# Patient Record
Sex: FEMALE | Race: WHITE
[De-identification: names, ages, dates, MRNs, and addresses within clinical notes are randomized per-mention and may not be internally consistent; named-entity substitution may affect disease eponyms.]

---

## 2017-01-19 ENCOUNTER — HOSPITAL ENCOUNTER (OUTPATIENT)
Dept: HOSPITAL 53 - M LAB REF | Age: 27
End: 2017-01-19
Attending: ADVANCED PRACTICE MIDWIFE
Payer: COMMERCIAL

## 2017-01-19 DIAGNOSIS — Z12.4: Primary | ICD-10-CM

## 2017-01-20 ENCOUNTER — HOSPITAL ENCOUNTER (OUTPATIENT)
Dept: HOSPITAL 53 - M LAB | Age: 27
End: 2017-01-20
Attending: ADVANCED PRACTICE MIDWIFE
Payer: COMMERCIAL

## 2017-01-20 DIAGNOSIS — C50.919: Primary | ICD-10-CM

## 2017-01-20 DIAGNOSIS — M81.0: ICD-10-CM

## 2017-01-20 DIAGNOSIS — Z13.820: ICD-10-CM

## 2017-01-20 LAB
BASOPHILS # BLD AUTO: 0.1 K/MM3 (ref 0–0.2)
BASOPHILS NFR BLD AUTO: 1.5 % (ref 0–1)
EOSINOPHIL # BLD AUTO: 0.1 K/MM3 (ref 0–0.5)
EOSINOPHIL NFR BLD AUTO: 2.3 % (ref 0–3)
ERYTHROCYTE [DISTWIDTH] IN BLOOD BY AUTOMATED COUNT: 12.6 % (ref 11.5–14.5)
GLUCOSE SERPL-MCNC: 84 MG/DL (ref 70–105)
LARGE UNSTAINED CELL #: 0.2 K/MM3 (ref 0–0.4)
LARGE UNSTAINED CELL %: 3.4 % (ref 0–4)
LYMPHOCYTES # BLD AUTO: 1.6 K/MM3 (ref 1.5–6.5)
LYMPHOCYTES NFR BLD AUTO: 26.9 % (ref 24–44)
MCH RBC QN AUTO: 31.1 PG (ref 27–33)
MCHC RBC AUTO-ENTMCNC: 34.2 G/DL (ref 32–36.5)
MCV RBC AUTO: 91.1 FL (ref 80–96)
MONOCYTES # BLD AUTO: 0.3 K/MM3 (ref 0–0.8)
MONOCYTES NFR BLD AUTO: 6.4 % (ref 0–5)
NEUTROPHILS # BLD AUTO: 3.1 K/MM3 (ref 1.8–7.7)
NEUTROPHILS NFR BLD AUTO: 59.5 % (ref 36–66)
PLATELET # BLD AUTO: 269 K/MM3 (ref 150–450)
T3RU NFR SERPL: 36 % (ref 30–39)
T4 SERPL-MCNC: 10.2 UG/DL (ref 4.5–12)
WBC # BLD AUTO: 5.2 K/MM3 (ref 4–10)

## 2017-03-19 ENCOUNTER — HOSPITAL ENCOUNTER (OUTPATIENT)
Dept: HOSPITAL 53 - M LAB REF | Age: 27
End: 2017-03-19
Attending: PHYSICIAN ASSISTANT
Payer: COMMERCIAL

## 2017-03-19 DIAGNOSIS — J02.9: Primary | ICD-10-CM

## 2017-05-22 ENCOUNTER — HOSPITAL ENCOUNTER (EMERGENCY)
Dept: HOSPITAL 53 - M ED | Age: 27
Discharge: HOME | End: 2017-05-22
Payer: COMMERCIAL

## 2017-05-22 VITALS — HEIGHT: 61 IN | BODY MASS INDEX: 26.43 KG/M2 | WEIGHT: 140 LBS

## 2017-05-22 VITALS — DIASTOLIC BLOOD PRESSURE: 75 MMHG | SYSTOLIC BLOOD PRESSURE: 116 MMHG

## 2017-05-22 DIAGNOSIS — N83.201: Primary | ICD-10-CM

## 2017-05-22 LAB
ALBUMIN SERPL BCG-MCNC: 3.8 GM/DL (ref 3.2–5.2)
ALBUMIN/GLOB SERPL: 1.15 {RATIO} (ref 1–1.93)
ALP SERPL-CCNC: 52 U/L (ref 45–117)
ALT SERPL W P-5'-P-CCNC: 21 U/L (ref 12–78)
ANION GAP SERPL CALC-SCNC: 8 MEQ/L (ref 8–16)
AST SERPL-CCNC: 22 U/L (ref 15–37)
BASOPHILS # BLD AUTO: 0 K/MM3 (ref 0–0.2)
BASOPHILS NFR BLD AUTO: 0.5 % (ref 0–1)
BILIRUB CONJ SERPL-MCNC: < 0.1 MG/DL (ref 0–0.2)
BILIRUB SERPL-MCNC: 0.4 MG/DL (ref 0.2–1)
BUN SERPL-MCNC: 12 MG/DL (ref 7–18)
CALCIUM SERPL-MCNC: 8.4 MG/DL (ref 8.5–10.1)
CHLORIDE SERPL-SCNC: 104 MEQ/L (ref 98–107)
CO2 SERPL-SCNC: 27 MEQ/L (ref 21–32)
CONTROL LINE HCG: (no result)
CREAT SERPL-MCNC: 0.79 MG/DL (ref 0.55–1.02)
EOSINOPHIL # BLD AUTO: 0.2 K/MM3 (ref 0–0.5)
EOSINOPHIL NFR BLD AUTO: 2.4 % (ref 0–3)
ERYTHROCYTE [DISTWIDTH] IN BLOOD BY AUTOMATED COUNT: 12.2 % (ref 11.5–14.5)
GFR SERPL CREATININE-BSD FRML MDRD: > 60 ML/MIN/{1.73_M2} (ref 60–?)
GLUCOSE SERPL-MCNC: 89 MG/DL (ref 70–105)
INR PPP: 0.94
LARGE UNSTAINED CELL #: 0.2 K/MM3 (ref 0–0.4)
LARGE UNSTAINED CELL %: 2 % (ref 0–4)
LYMPHOCYTES # BLD AUTO: 2 K/MM3 (ref 1.5–6.5)
LYMPHOCYTES NFR BLD AUTO: 24.9 % (ref 24–44)
MCH RBC QN AUTO: 32.9 PG (ref 27–33)
MCHC RBC AUTO-ENTMCNC: 35.9 G/DL (ref 32–36.5)
MCV RBC AUTO: 91.6 FL (ref 80–96)
MONOCYTES # BLD AUTO: 0.4 K/MM3 (ref 0–0.8)
MONOCYTES NFR BLD AUTO: 5.4 % (ref 0–5)
NEUTROPHILS # BLD AUTO: 5.2 K/MM3 (ref 1.8–7.7)
NEUTROPHILS NFR BLD AUTO: 64.8 % (ref 36–66)
PLATELET # BLD AUTO: 287 K/MM3 (ref 150–450)
POTASSIUM SERPL-SCNC: 3.8 MEQ/L (ref 3.5–5.1)
PROT SERPL-MCNC: 7.1 GM/DL (ref 6.4–8.2)
SODIUM SERPL-SCNC: 139 MEQ/L (ref 136–145)
WBC # BLD AUTO: 8 K/MM3 (ref 4–10)

## 2017-05-22 PROCEDURE — 99283 EMERGENCY DEPT VISIT LOW MDM: CPT

## 2017-05-22 PROCEDURE — 80048 BASIC METABOLIC PNL TOTAL CA: CPT

## 2017-05-22 PROCEDURE — 84703 CHORIONIC GONADOTROPIN ASSAY: CPT

## 2017-05-22 PROCEDURE — 81001 URINALYSIS AUTO W/SCOPE: CPT

## 2017-05-22 PROCEDURE — 74177 CT ABD & PELVIS W/CONTRAST: CPT

## 2017-05-22 PROCEDURE — 85610 PROTHROMBIN TIME: CPT

## 2017-05-22 PROCEDURE — 80076 HEPATIC FUNCTION PANEL: CPT

## 2017-05-22 PROCEDURE — 96361 HYDRATE IV INFUSION ADD-ON: CPT

## 2017-05-22 PROCEDURE — 86850 RBC ANTIBODY SCREEN: CPT

## 2017-05-22 PROCEDURE — 87086 URINE CULTURE/COLONY COUNT: CPT

## 2017-05-22 PROCEDURE — 85025 COMPLETE CBC W/AUTO DIFF WBC: CPT

## 2017-05-22 PROCEDURE — 83690 ASSAY OF LIPASE: CPT

## 2017-05-22 PROCEDURE — 86901 BLOOD TYPING SEROLOGIC RH(D): CPT

## 2017-05-22 PROCEDURE — 96374 THER/PROPH/DIAG INJ IV PUSH: CPT

## 2017-05-22 PROCEDURE — 86900 BLOOD TYPING SEROLOGIC ABO: CPT

## 2017-05-22 NOTE — REP
Clinical:  Acute lower abdominal pain.

 

Technique:  Axial contrast enhanced images from the lung bases to the pubic

symphysis using 100 ml Isovue 370 intravenous contrast material with coronal and

sagittal re-formations.

 

Findings:

A 2.1 cm rim enhancing left ovarian cyst is appreciated with surrounding fluid

and small amount of fluid extending into the pelvis likely related to patient's

symptoms and compatible with involuting, possibly ruptured ovarian cyst. The

uterus and right adnexa appear normal.

 

Lung bases are clear.  Visualized heart and pericardium are normal.  Liver,

spleen, pancreas, gallbladder, bilateral adrenal glands and kidneys are normal.

The enteric system is without obstruction or acute inflammatory process.  Normal

terminal ileum and appendix identified in the right lower quadrant.  Pelvis

demonstrates normal bladder and findings as described above.  No ascites.  No

free air.  No retroperitoneal or intraperitoneal adenopathy.  Vasculature

including abdominal aorta is normal.  Surrounding musculoskeletal structures

without focal osseous abnormality.

 

Impression:

1.  A 2.1 cm rim enhancing left ovarian cyst with surrounding fluid and trace

fluid extending to the pelvis most compatible with involuting, possibly ruptured

ovarian cyst and likely related to patient's symptoms.

2.  Remainder examination appears normal.

 

 

Signed by

Fuentes Castellanos MD 05/22/2017 07:31 P

## 2017-09-21 ENCOUNTER — HOSPITAL ENCOUNTER (OUTPATIENT)
Dept: HOSPITAL 53 - M RAD | Age: 27
End: 2017-09-21
Attending: ADVANCED PRACTICE MIDWIFE
Payer: COMMERCIAL

## 2017-09-21 DIAGNOSIS — R10.9: Primary | ICD-10-CM

## 2017-09-21 NOTE — REP
Pelvic ultrasound including transabdominal, endovaginal and Doppler ultrasound

assessment:

 

Comparison is 12/01/2016.

 

The bladder is adequately distended.

 

The uterus is anteverted and normal size measuring 7.4 x 3.4 x 3.4 cm.  The

myometrium has a mildly heterogeneous.

 

The endometrium is not thickened measuring 6.5 mm.

 

The ovaries are normal size.

Right ovary measures 3.3 x 2.1 x 2.7 cm.

The left ovary measures 2.9 x 1.3 x 2.2 cm.

 

With Doppler assessment there is vascular flow in both ovaries with the Doppler

resistive index of the intraparenchymal arteries on the left measuring 0.53 and

on the right 0.60.

 

There are a few small follicles in each ovary.  No dominant ovarian mass or cyst.

There is no free fluid in the pelvis.

 

Impression:

 

Essentially negative pelvic ultrasound.

 

 

Signed by

Pako Mccall MD 09/21/2017 08:18 A

## 2017-10-18 ENCOUNTER — HOSPITAL ENCOUNTER (OUTPATIENT)
Dept: HOSPITAL 53 - M LAB | Age: 27
End: 2017-10-18
Attending: ADVANCED PRACTICE MIDWIFE
Payer: COMMERCIAL

## 2017-10-18 DIAGNOSIS — N91.2: Primary | ICD-10-CM

## 2018-03-03 ENCOUNTER — HOSPITAL ENCOUNTER (EMERGENCY)
Dept: HOSPITAL 53 - M ED | Age: 28
Discharge: HOME | End: 2018-03-03
Payer: COMMERCIAL

## 2018-03-03 DIAGNOSIS — M25.532: Primary | ICD-10-CM

## 2018-03-03 PROCEDURE — 73110 X-RAY EXAM OF WRIST: CPT

## 2018-03-03 RX ADMIN — ACETAMINOPHEN 1 MG: 325 TABLET ORAL at 09:25

## 2018-03-16 ENCOUNTER — HOSPITAL ENCOUNTER (OUTPATIENT)
Dept: HOSPITAL 53 - M LAB | Age: 28
End: 2018-03-16
Attending: ADVANCED PRACTICE MIDWIFE
Payer: COMMERCIAL

## 2018-03-16 DIAGNOSIS — Z36.89: Primary | ICD-10-CM

## 2018-03-16 DIAGNOSIS — Z3A.10: ICD-10-CM

## 2018-03-16 LAB
ALT SERPL W P-5'-P-CCNC: 24 U/L (ref 12–78)
AST SERPL-CCNC: 14 U/L (ref 7–37)
BASO #: 0 10^3/UL (ref 0–0.2)
BASO %: 0.2 % (ref 0–1)
BILIRUBIN,TOTAL: 0.4 MG/DL (ref 0.2–1)
CHLAMYDIA DNA AMPLIFICATION: NEGATIVE
CREATININE FOR GFR: 0.45 MG/DL (ref 0.55–1.3)
CREATININE,RANDOM URINE: 179 MG/DL
EOS #: 0.2 10^3/UL (ref 0–0.5)
EOSINOPHIL NFR BLD AUTO: 2.1 % (ref 0–3)
GC DNA AMPLIFICATION: NEGATIVE
GFR SERPL CREATININE-BSD FRML MDRD: > 60 ML/MIN/{1.73_M2} (ref 60–?)
HBSAG PRENATAL: NEGATIVE
HCV AB SER QL: 0 INDEX (ref ?–0.8)
HEMATOCRIT: 38.2 % (ref 36–47)
HEMOGLOBIN: 13.8 G/DL (ref 12–16)
HIV 1&2 SCREEN CENTAUR: NEGATIVE
IMMATURE GRANULOCYTE %: 0.3 % (ref 0–3)
LDH SERPL L TO P-CCNC: 171 U/L (ref 84–246)
LYMPH #: 1.9 10^3/UL (ref 1.5–6.5)
LYMPH %: 18.4 % (ref 24–44)
MEAN CORPUSCULAR HEMOGLOBIN: 31.9 PG (ref 27–33)
MEAN CORPUSCULAR HGB CONC: 36.1 G/DL (ref 32–36.5)
MEAN CORPUSCULAR VOLUME: 88.4 FL (ref 80–96)
MONO #: 0.7 10^3/UL (ref 0–0.8)
MONO %: 7 % (ref 0–5)
NEUTROPHILS #: 7.6 10^3/UL (ref 1.8–7.7)
NEUTROPHILS %: 72 % (ref 36–66)
NRBC BLD AUTO-RTO: 0 % (ref 0–0)
PLATELET COUNT, AUTOMATED: 302 10^3/UL (ref 150–450)
RED BLOOD COUNT: 4.32 10^6/UL (ref 4–5.4)
RED CELL DISTRIBUTION WIDTH: 12.2 % (ref 11.5–14.5)
RUBELLA IGG QUALITATIVE: (no result)
SYPHILIS: NONREACTIVE
TOTAL PROTEIN,RANDOM URINE: 9.1 MG/DL (ref 0–12)
URIC ACID: 1.9 MG/DL (ref 2.6–6)
WHITE BLOOD COUNT: 10.5 10^3/UL (ref 4–10)

## 2018-03-16 PROCEDURE — 84460 ALANINE AMINO (ALT) (SGPT): CPT

## 2018-04-21 ENCOUNTER — HOSPITAL ENCOUNTER (EMERGENCY)
Dept: HOSPITAL 53 - M ED | Age: 28
Discharge: HOME | End: 2018-04-21
Payer: COMMERCIAL

## 2018-04-21 DIAGNOSIS — O21.9: Primary | ICD-10-CM

## 2018-04-21 DIAGNOSIS — Z79.899: ICD-10-CM

## 2018-04-21 DIAGNOSIS — R19.7: ICD-10-CM

## 2018-04-21 DIAGNOSIS — Z3A.17: ICD-10-CM

## 2018-04-21 DIAGNOSIS — O99.89: ICD-10-CM

## 2018-04-21 DIAGNOSIS — Z88.5: ICD-10-CM

## 2018-04-21 DIAGNOSIS — O16.2: ICD-10-CM

## 2018-04-21 LAB
ALBUMIN/GLOBULIN RATIO: 0.91 (ref 1–1.93)
ALBUMIN: 3.2 GM/DL (ref 3.2–5.2)
ALKALINE PHOSPHATASE: 60 U/L (ref 45–117)
ALT SERPL W P-5'-P-CCNC: 22 U/L (ref 12–78)
AMYLASE SERPL-CCNC: 28 U/L (ref 25–115)
ANION GAP: 9 MEQ/L (ref 8–16)
AST SERPL-CCNC: 15 U/L (ref 7–37)
BASO #: 0 10^3/UL (ref 0–0.2)
BASO %: 0.2 % (ref 0–1)
BILIRUB CONJ SERPL-MCNC: 0.2 MG/DL (ref 0–0.2)
BILIRUBIN,TOTAL: 0.8 MG/DL (ref 0.2–1)
BLOOD UREA NITROGEN: 7 MG/DL (ref 7–18)
CALCIUM LEVEL: 8.3 MG/DL (ref 8.5–10.1)
CARBON DIOXIDE LEVEL: 24 MEQ/L (ref 21–32)
CHLORIDE LEVEL: 105 MEQ/L (ref 98–107)
CREATININE FOR GFR: 0.52 MG/DL (ref 0.55–1.3)
EOS #: 0.1 10^3/UL (ref 0–0.5)
EOSINOPHIL NFR BLD AUTO: 0.5 % (ref 0–3)
GFR SERPL CREATININE-BSD FRML MDRD: > 60 ML/MIN/{1.73_M2} (ref 60–?)
GLUCOSE, FASTING: 87 MG/DL (ref 70–100)
HEMATOCRIT: 35.8 % (ref 36–47)
HEMOGLOBIN: 12.8 G/DL (ref 12–15.5)
IMMATURE GRANULOCYTE %: 0.5 % (ref 0–3)
LEUKOCYTE ESTERASE UR AUTO RFX: (no result)
LIPASE: 62 U/L (ref 73–393)
LYMPH #: 0.9 10^3/UL (ref 1.5–6.5)
LYMPH %: 6.9 % (ref 24–44)
MEAN CORPUSCULAR HEMOGLOBIN: 31.8 PG (ref 27–33)
MEAN CORPUSCULAR HGB CONC: 35.8 G/DL (ref 32–36.5)
MEAN CORPUSCULAR VOLUME: 88.8 FL (ref 80–96)
MONO #: 0.5 10^3/UL (ref 0–0.8)
MONO %: 3.8 % (ref 0–5)
NEUTROPHILS #: 11.3 10^3/UL (ref 1.8–7.7)
NEUTROPHILS %: 88.1 % (ref 36–66)
NRBC BLD AUTO-RTO: 0 % (ref 0–0)
PLATELET COUNT, AUTOMATED: 250 10^3/UL (ref 150–450)
POTASSIUM SERUM: 3.8 MEQ/L (ref 3.5–5.1)
RED BLOOD COUNT: 4.03 10^6/UL (ref 4–5.4)
RED CELL DISTRIBUTION WIDTH: 13.2 % (ref 11.5–14.5)
SODIUM LEVEL: 138 MEQ/L (ref 136–145)
SPECIFIC GRAVITY UR AUTO RFX: 1.03 (ref 1–1.03)
SQUAM EPITHELIAL CELL UR AURFX: 9 /HPF (ref 0–6)
TOTAL PROTEIN: 6.7 GM/DL (ref 6.4–8.2)
WHITE BLOOD COUNT: 12.8 10^3/UL (ref 4–10)

## 2018-04-21 RX ADMIN — SODIUM CHLORIDE 1 MLS/HR: 9 INJECTION, SOLUTION INTRAVENOUS at 08:00

## 2018-04-21 RX ADMIN — ONDANSETRON 1 MG: 2 INJECTION INTRAMUSCULAR; INTRAVENOUS at 08:20

## 2018-05-02 ENCOUNTER — HOSPITAL ENCOUNTER (OUTPATIENT)
Dept: HOSPITAL 53 - M RAD | Age: 28
End: 2018-05-02
Attending: ADVANCED PRACTICE MIDWIFE
Payer: COMMERCIAL

## 2018-05-02 DIAGNOSIS — Z34.82: Primary | ICD-10-CM

## 2018-05-02 PROCEDURE — 76816 OB US FOLLOW-UP PER FETUS: CPT

## 2018-05-15 ENCOUNTER — HOSPITAL ENCOUNTER (OUTPATIENT)
Dept: HOSPITAL 53 - M RAD | Age: 28
End: 2018-05-15
Payer: COMMERCIAL

## 2018-05-15 DIAGNOSIS — Z34.82: Primary | ICD-10-CM

## 2018-05-15 DIAGNOSIS — Z3A.20: ICD-10-CM

## 2018-05-15 PROCEDURE — 76816 OB US FOLLOW-UP PER FETUS: CPT

## 2018-06-19 ENCOUNTER — HOSPITAL ENCOUNTER (OUTPATIENT)
Dept: HOSPITAL 53 - M SMT | Age: 28
End: 2018-06-19
Attending: ADVANCED PRACTICE MIDWIFE
Payer: COMMERCIAL

## 2018-06-19 ENCOUNTER — HOSPITAL ENCOUNTER (OUTPATIENT)
Dept: HOSPITAL 53 - M LAB REF | Age: 28
End: 2018-06-19
Attending: ADVANCED PRACTICE MIDWIFE
Payer: COMMERCIAL

## 2018-06-19 DIAGNOSIS — O10.012: Primary | ICD-10-CM

## 2018-06-19 DIAGNOSIS — Z3A.00: ICD-10-CM

## 2018-06-19 LAB
ALT SERPL W P-5'-P-CCNC: 21 U/L (ref 12–78)
AST SERPL-CCNC: 13 U/L (ref 7–37)
BILIRUBIN,TOTAL: 0.3 MG/DL (ref 0.2–1)
CREATININE FOR GFR: 0.49 MG/DL (ref 0.55–1.3)
CREATININE,RANDOM URINE: 237 MG/DL
GFR SERPL CREATININE-BSD FRML MDRD: > 60 ML/MIN/{1.73_M2} (ref 60–?)
HEMATOCRIT: 33.7 % (ref 36–47)
HEMOGLOBIN: 11.7 G/DL (ref 12–15.5)
LDH SERPL L TO P-CCNC: 206 U/L (ref 84–246)
MEAN CORPUSCULAR HEMOGLOBIN: 31.5 PG (ref 27–33)
MEAN CORPUSCULAR HGB CONC: 34.7 G/DL (ref 32–36.5)
MEAN CORPUSCULAR VOLUME: 90.8 FL (ref 80–96)
NRBC BLD AUTO-RTO: 0 % (ref 0–0)
PLATELET COUNT, AUTOMATED: 289 10^3/UL (ref 150–450)
RED BLOOD COUNT: 3.71 10^6/UL (ref 4–5.4)
RED CELL DISTRIBUTION WIDTH: 13.5 % (ref 11.5–14.5)
TOTAL PROTEIN,RANDOM URINE: 32.1 MG/DL (ref 0–12)
URIC ACID: 2.7 MG/DL (ref 2.6–6)
WHITE BLOOD COUNT: 12.2 10^3/UL (ref 4–10)

## 2018-06-19 PROCEDURE — 84460 ALANINE AMINO (ALT) (SGPT): CPT

## 2018-07-07 ENCOUNTER — HOSPITAL ENCOUNTER (OUTPATIENT)
Dept: HOSPITAL 53 - M WUC | Age: 28
End: 2018-07-07
Attending: ADVANCED PRACTICE MIDWIFE
Payer: COMMERCIAL

## 2018-07-07 DIAGNOSIS — O10.012: Primary | ICD-10-CM

## 2018-07-07 LAB
BASO #: 0 10^3/UL (ref 0–0.2)
BASO %: 0.1 % (ref 0–1)
EOS #: 0.2 10^3/UL (ref 0–0.5)
EOSINOPHIL NFR BLD AUTO: 1.2 % (ref 0–3)
GLUCOSE CHALLENGE TEST 1 HOUR: 98 MG/DL (ref ?–140)
HEMATOCRIT: 33.7 % (ref 36–47)
HEMOGLOBIN: 11.4 G/DL (ref 12–15.5)
IMMATURE GRANULOCYTE %: 0.9 % (ref 0–3)
LYMPH #: 1.8 10^3/UL (ref 1.5–6.5)
LYMPH %: 12.5 % (ref 24–44)
MEAN CORPUSCULAR HEMOGLOBIN: 31.4 PG (ref 27–33)
MEAN CORPUSCULAR HGB CONC: 33.8 G/DL (ref 32–36.5)
MEAN CORPUSCULAR VOLUME: 92.8 FL (ref 80–96)
MONO #: 0.8 10^3/UL (ref 0–0.8)
MONO %: 5.2 % (ref 0–5)
NEUTROPHILS #: 11.5 10^3/UL (ref 1.8–7.7)
NEUTROPHILS %: 80.1 % (ref 36–66)
NRBC BLD AUTO-RTO: 0 % (ref 0–0)
PLATELET COUNT, AUTOMATED: 267 10^3/UL (ref 150–450)
RED BLOOD COUNT: 3.63 10^6/UL (ref 4–5.4)
RED CELL DISTRIBUTION WIDTH: 13.9 % (ref 11.5–14.5)
WHITE BLOOD COUNT: 14.4 10^3/UL (ref 4–10)

## 2018-07-07 PROCEDURE — 82950 GLUCOSE TEST: CPT

## 2018-07-09 LAB — TYPE AND SCREEN: 1 1

## 2018-07-25 ENCOUNTER — HOSPITAL ENCOUNTER (OUTPATIENT)
Dept: HOSPITAL 53 - M RAD | Age: 28
End: 2018-07-25
Attending: ADVANCED PRACTICE MIDWIFE
Payer: COMMERCIAL

## 2018-07-25 DIAGNOSIS — O10.012: Primary | ICD-10-CM

## 2018-07-25 DIAGNOSIS — Z3A.31: ICD-10-CM

## 2018-07-25 PROCEDURE — 76816 OB US FOLLOW-UP PER FETUS: CPT

## 2018-08-02 ENCOUNTER — HOSPITAL ENCOUNTER (OUTPATIENT)
Dept: HOSPITAL 53 - M LDO | Age: 28
Discharge: HOME | End: 2018-08-02
Attending: ADVANCED PRACTICE MIDWIFE
Payer: COMMERCIAL

## 2018-08-02 DIAGNOSIS — R51: ICD-10-CM

## 2018-08-02 DIAGNOSIS — Z3A.31: ICD-10-CM

## 2018-08-02 DIAGNOSIS — Z87.59: ICD-10-CM

## 2018-08-02 DIAGNOSIS — O99.89: Primary | ICD-10-CM

## 2018-08-02 DIAGNOSIS — O13.3: ICD-10-CM

## 2018-08-02 LAB
ALT SERPL W P-5'-P-CCNC: 19 U/L (ref 12–78)
AST SERPL-CCNC: 14 U/L (ref 7–37)
BASO #: 0 10^3/UL (ref 0–0.2)
BASO %: 0.1 % (ref 0–1)
BILIRUBIN,TOTAL: 0.3 MG/DL (ref 0.2–1)
CREATININE FOR GFR: 0.49 MG/DL (ref 0.55–1.3)
CREATININE,RANDOM URINE: 129 MG/DL
EOS #: 0.1 10^3/UL (ref 0–0.5)
EOSINOPHIL NFR BLD AUTO: 1 % (ref 0–3)
GFR SERPL CREATININE-BSD FRML MDRD: > 60 ML/MIN/{1.73_M2} (ref 60–?)
HEMATOCRIT: 34.1 % (ref 36–47)
HEMOGLOBIN: 11.7 G/DL (ref 12–15.5)
IMMATURE GRANULOCYTE %: 1 % (ref 0–3)
LDH SERPL L TO P-CCNC: 186 U/L (ref 84–246)
LYMPH #: 1.5 10^3/UL (ref 1.5–4.5)
LYMPH %: 11.4 % (ref 24–44)
MEAN CORPUSCULAR HEMOGLOBIN: 31.4 PG (ref 27–33)
MEAN CORPUSCULAR HGB CONC: 34.3 G/DL (ref 32–36.5)
MEAN CORPUSCULAR VOLUME: 91.4 FL (ref 80–96)
MONO #: 0.9 10^3/UL (ref 0–0.8)
MONO %: 6.6 % (ref 0–5)
NEUTROPHILS #: 10.7 10^3/UL (ref 1.8–7.7)
NEUTROPHILS %: 79.9 % (ref 36–66)
NRBC BLD AUTO-RTO: 0 % (ref 0–0)
PLATELET COUNT, AUTOMATED: 233 10^3/UL (ref 150–450)
RED BLOOD COUNT: 3.73 10^6/UL (ref 4–5.4)
RED CELL DISTRIBUTION WIDTH: 14.3 % (ref 11.5–14.5)
TOTAL PROTEIN,RANDOM URINE: 27.9 MG/DL (ref 0–12)
URIC ACID: 3.3 MG/DL (ref 2.6–6)
WHITE BLOOD COUNT: 13.5 10^3/UL (ref 4–10)

## 2018-08-02 PROCEDURE — 59025 FETAL NON-STRESS TEST: CPT

## 2018-08-02 RX ADMIN — LABETALOL HYDROCHLORIDE 1 MG: 100 TABLET, FILM COATED ORAL at 07:55

## 2018-08-14 ENCOUNTER — HOSPITAL ENCOUNTER (OUTPATIENT)
Dept: HOSPITAL 53 - M RAD | Age: 28
End: 2018-08-14
Attending: ADVANCED PRACTICE MIDWIFE
Payer: COMMERCIAL

## 2018-08-14 DIAGNOSIS — Z3A.33: ICD-10-CM

## 2018-08-14 DIAGNOSIS — O10.013: Primary | ICD-10-CM

## 2018-08-14 PROCEDURE — 76816 OB US FOLLOW-UP PER FETUS: CPT

## 2018-08-27 ENCOUNTER — HOSPITAL ENCOUNTER (OUTPATIENT)
Dept: HOSPITAL 53 - M LAB REF | Age: 28
End: 2018-08-27
Attending: ADVANCED PRACTICE MIDWIFE
Payer: COMMERCIAL

## 2018-08-27 DIAGNOSIS — O10.013: Primary | ICD-10-CM

## 2018-09-04 ENCOUNTER — HOSPITAL ENCOUNTER (OUTPATIENT)
Dept: HOSPITAL 53 - M RAD | Age: 28
End: 2018-09-04
Attending: ADVANCED PRACTICE MIDWIFE
Payer: COMMERCIAL

## 2018-09-04 DIAGNOSIS — O10.012: Primary | ICD-10-CM

## 2019-03-13 ENCOUNTER — HOSPITAL ENCOUNTER (OUTPATIENT)
Dept: HOSPITAL 53 - M LAB REF | Age: 29
End: 2019-03-13
Attending: PHYSICIAN ASSISTANT
Payer: COMMERCIAL

## 2019-03-13 DIAGNOSIS — I10: Primary | ICD-10-CM

## 2019-03-13 LAB
ALBUMIN SERPL BCG-MCNC: 4.2 GM/DL (ref 3.2–5.2)
ALT SERPL W P-5'-P-CCNC: 24 U/L (ref 12–78)
BASOPHILS # BLD AUTO: 0 10^3/UL (ref 0–0.2)
BASOPHILS NFR BLD AUTO: 0.5 % (ref 0–1)
BILIRUB SERPL-MCNC: 0.8 MG/DL (ref 0.2–1)
BUN SERPL-MCNC: 15 MG/DL (ref 7–18)
CALCIUM SERPL-MCNC: 8.6 MG/DL (ref 8.5–10.1)
CHLORIDE SERPL-SCNC: 106 MEQ/L (ref 98–107)
CHOLEST SERPL-MCNC: 143 MG/DL (ref ?–200)
CHOLEST/HDLC SERPL: 3.33 {RATIO} (ref ?–5)
CO2 SERPL-SCNC: 27 MEQ/L (ref 21–32)
CREAT SERPL-MCNC: 0.84 MG/DL (ref 0.55–1.3)
EOSINOPHIL # BLD AUTO: 0.2 10^3/UL (ref 0–0.5)
EOSINOPHIL NFR BLD AUTO: 3.1 % (ref 0–3)
EST. AVERAGE GLUCOSE BLD GHB EST-MCNC: 91 MG/DL (ref 60–110)
GFR SERPL CREATININE-BSD FRML MDRD: > 60 ML/MIN/{1.73_M2} (ref 60–?)
GLUCOSE SERPL-MCNC: 73 MG/DL (ref 70–100)
HCT VFR BLD AUTO: 41.8 % (ref 36–47)
HDLC SERPL-MCNC: 43 MG/DL (ref 40–?)
HGB BLD-MCNC: 14.3 G/DL (ref 12–15.5)
LDLC SERPL CALC-MCNC: 88 MG/DL (ref ?–100)
LYMPHOCYTES # BLD AUTO: 1.5 10^3/UL (ref 1.5–4.5)
LYMPHOCYTES NFR BLD AUTO: 25.3 % (ref 24–44)
MCH RBC QN AUTO: 30.2 PG (ref 27–33)
MCHC RBC AUTO-ENTMCNC: 34.2 G/DL (ref 32–36.5)
MCV RBC AUTO: 88.2 FL (ref 80–96)
MONOCYTES # BLD AUTO: 0.4 10^3/UL (ref 0–0.8)
MONOCYTES NFR BLD AUTO: 7.5 % (ref 0–5)
NEUTROPHILS # BLD AUTO: 3.7 10^3/UL (ref 1.8–7.7)
NEUTROPHILS NFR BLD AUTO: 63.4 % (ref 36–66)
NONHDLC SERPL-MCNC: 100 MG/DL
PLATELET # BLD AUTO: 322 10^3/UL (ref 150–450)
POTASSIUM SERPL-SCNC: 4.1 MEQ/L (ref 3.5–5.1)
PROT SERPL-MCNC: 7.3 GM/DL (ref 6.4–8.2)
RBC # BLD AUTO: 4.74 10^6/UL (ref 4–5.4)
SODIUM SERPL-SCNC: 140 MEQ/L (ref 136–145)
TRIGL SERPL-MCNC: 59 MG/DL (ref ?–150)
WBC # BLD AUTO: 5.8 10^3/UL (ref 4–10)

## 2020-05-27 ENCOUNTER — HOSPITAL ENCOUNTER (OUTPATIENT)
Dept: HOSPITAL 53 - M RAD | Age: 30
End: 2020-05-27
Attending: SURGERY
Payer: COMMERCIAL

## 2020-05-27 DIAGNOSIS — R10.13: Primary | ICD-10-CM

## 2020-05-28 NOTE — REP
Clinical:  Epigastric pain.

 

Technique:  Real time gray scale ultrasound examination using curved array

transducer.

 

Findings:

Liver and visualized pancreas are normal in contour, size, echogenicity without

focal hepatic or pancreatic lesion identified.  Gallbladder is normal and without

gallstones, wall thickening, or pericholecystic fluid.  No biliary ductal

dilatation is appreciated and the common bile duct measures 3.6 mm diameter.

Right kidney is normal in reniform shape without hydronephrosis and measures 9.7

x 4.5 x 4.7 cm.  Visualized portions of the abdominal aorta appear normal.  No

ascites.

 

Impression:

Normal limited abdominal ultrasound.

 

 

Electronically Signed by

Fuentes Castellanos MD 05/28/2020 04:07 A

## 2020-09-16 ENCOUNTER — HOSPITAL ENCOUNTER (OUTPATIENT)
Dept: HOSPITAL 53 - M LAB | Age: 30
End: 2020-09-16
Attending: ADVANCED PRACTICE MIDWIFE
Payer: COMMERCIAL

## 2020-09-16 DIAGNOSIS — Z3A.00: ICD-10-CM

## 2020-09-16 DIAGNOSIS — Z34.91: Primary | ICD-10-CM

## 2020-09-16 LAB
ALT SERPL W P-5'-P-CCNC: 24 U/L (ref 12–78)
BASOPHILS # BLD AUTO: 0 10^3/UL (ref 0–0.2)
BASOPHILS NFR BLD AUTO: 0.5 % (ref 0–1)
BILIRUB SERPL-MCNC: 0.3 MG/DL (ref 0.2–1)
CHLAMYDIA DNA AMPLIFICATION: NEGATIVE
CREAT SERPL-MCNC: 0.71 MG/DL (ref 0.55–1.3)
CREAT UR-MCNC: 21.9 MG/DL
EOSINOPHIL # BLD AUTO: 0.2 10^3/UL (ref 0–0.5)
EOSINOPHIL NFR BLD AUTO: 2 % (ref 0–3)
GFR SERPL CREATININE-BSD FRML MDRD: > 60 ML/MIN/{1.73_M2} (ref 60–?)
HCT VFR BLD AUTO: 37.4 % (ref 36–47)
HCV AB SER QL: 0.1 INDEX (ref ?–0.8)
HGB BLD-MCNC: 12.8 G/DL (ref 12–15.5)
HIV 1+2 AB+HIV1 P24 AG SERPL QL IA: NEGATIVE
LDH SERPL L TO P-CCNC: 190 U/L (ref 84–246)
LYMPHOCYTES # BLD AUTO: 2 10^3/UL (ref 1.5–5)
LYMPHOCYTES NFR BLD AUTO: 22.6 % (ref 24–44)
MCH RBC QN AUTO: 32.1 PG (ref 27–33)
MCHC RBC AUTO-ENTMCNC: 34.2 G/DL (ref 32–36.5)
MCV RBC AUTO: 93.7 FL (ref 80–96)
MONOCYTES # BLD AUTO: 0.8 10^3/UL (ref 0–0.8)
MONOCYTES NFR BLD AUTO: 8.9 % (ref 0–5)
N GONORRHOEA RRNA SPEC QL NAA+PROBE: NEGATIVE
NEUTROPHILS # BLD AUTO: 5.8 10^3/UL (ref 1.5–8.5)
NEUTROPHILS NFR BLD AUTO: 65.5 % (ref 36–66)
PLATELET # BLD AUTO: 281 10^3/UL (ref 150–450)
PROT UR-MCNC: 5.8 MG/DL (ref 0–12)
RBC # BLD AUTO: 3.99 10^6/UL (ref 4–5.4)
URATE SERPL-MCNC: 2.1 MG/DL (ref 2.6–6)
WBC # BLD AUTO: 8.8 10^3/UL (ref 4–10)

## 2020-12-08 ENCOUNTER — HOSPITAL ENCOUNTER (OUTPATIENT)
Dept: HOSPITAL 53 - M WHC | Age: 30
End: 2020-12-08
Attending: ADVANCED PRACTICE MIDWIFE
Payer: COMMERCIAL

## 2020-12-08 DIAGNOSIS — O10.012: Primary | ICD-10-CM

## 2020-12-08 DIAGNOSIS — Z3A.19: ICD-10-CM

## 2020-12-08 NOTE — REP
INDICATION:

ANATOMY.



TECHNIQUE:

Real-time sonographic evaluation of the gravid uterus performed.



FINDINGS:

Estimated gestational age 19 weeks 0 days, EDC 05/04/2021.  These measurements

indicate appropriate growth.

Presentation: Cephalic

Placenta posterior, grade 1, without evidence of placenta previa.

Fetal heart rate is recorded at 153 beats per minute.

Amniotic fluid is subjectively normal.

Closed cervical length is measured at 3.8 cm.



Biometry chart:



BPD: 43 mm, 19 weeks 1 days, 53rd percentile.

HC: 167 mm, 19 weeks 3 days, 62nd percentile

AC: 152 mm, 20 weeks 3 days, 81st percentile

Femur length: 33 mm, 20 weeks 2 days, 81st percentile

HC to AC ratio: 1.10, normal range 1.06-1.25.



Estimated fetal weight: 340g, over 97th percentile.



Fetal anatomy:



Cranium: Grossly normal

Lateral Ventricles/Choroid Plexus: Grossly normal

Posterior Fossa/Cerebellum: Grossly normal

Nose/lips/profile: Grossly normal

Four chamber heart: Grossly normal

Right ventricular outflow tract: Grossly normal

Left ventricular outflow tract: Grossly normal

Left-sided stomach: Grossly normal

Kidneys: There is bilateral pelviectasis, AP diameter of the renal pelves is 4 mm

maximally.  Ill-defined increased echogenicity in the fetal pelvis could represent

bowel.  Recommend follow-up.

Bladder: Grossly normal

Cord Insertion: Grossly normal

3 vessel cord: Grossly normal

Spine: Grossly normal



IMPRESSION:

Viable single intrauterine gestation as above.





<Electronically signed by Pako Brower > 12/08/20 [id number]

## 2020-12-11 ENCOUNTER — HOSPITAL ENCOUNTER (OUTPATIENT)
Dept: HOSPITAL 53 - M PLALAB | Age: 30
End: 2020-12-11
Attending: ADVANCED PRACTICE MIDWIFE
Payer: COMMERCIAL

## 2020-12-11 DIAGNOSIS — O28.3: Primary | ICD-10-CM

## 2020-12-11 DIAGNOSIS — Z3A.00: ICD-10-CM

## 2021-01-05 ENCOUNTER — HOSPITAL ENCOUNTER (OUTPATIENT)
Dept: HOSPITAL 53 - M WHC | Age: 31
End: 2021-01-05
Attending: ADVANCED PRACTICE MIDWIFE
Payer: COMMERCIAL

## 2021-01-05 DIAGNOSIS — Z3A.24: ICD-10-CM

## 2021-01-05 DIAGNOSIS — O10.012: Primary | ICD-10-CM

## 2021-01-05 NOTE — REP
INDICATION:

F/U ECHOGENICITY SEEN IN ABDOMEN



COMPARISON:

12/08/2020



TECHNIQUE:

Transabdominal obstetrical ultrasound with color Doppler evaluation.



FINDINGS:

Examination demonstrates a single live intrauterine pregnancy in cephalic

presentation.  Fetal motion is identified by technologist.  Placenta is noted

posterior and  grade 1 without evidence for placenta previa or abruption.  Amniotic

fluid volume is normal.  Cervix measures 4.0 cm in length and appears closed..



Gestational age by LMP 23 weeks 0 days with ISAIAH 05/04/2021.

Gestational age by current measurements 24 weeks 1 day with ISAIAH 04/26/2021.



FHR equals 153 beats per minute.

Estimated fetal weight 659 grams (89thpercentile).



Anatomical assessment demonstrates normal structures including normal appearance of

the bilateral kidneys with mild pelviectasis in normal range.  Previously identified

echogenic area within the pelvis is not visualized and may have represented bowel.



IMPRESSION:

Single live intrauterine pregnancy in cephalic presentation.  No gross abnormalities

are identified.





<Electronically signed by Fuentes Castellanos > 01/05/21 [id number]

## 2021-01-28 ENCOUNTER — HOSPITAL ENCOUNTER (OUTPATIENT)
Dept: HOSPITAL 53 - M LAB | Age: 31
End: 2021-01-28
Attending: ADVANCED PRACTICE MIDWIFE
Payer: COMMERCIAL

## 2021-01-28 DIAGNOSIS — O10.012: Primary | ICD-10-CM

## 2021-01-28 DIAGNOSIS — Z3A.00: ICD-10-CM

## 2021-01-28 LAB
HCT VFR BLD AUTO: 36.5 % (ref 36–47)
HGB BLD-MCNC: 12.2 G/DL (ref 12–15.5)
MCH RBC QN AUTO: 31.9 PG (ref 27–33)
MCHC RBC AUTO-ENTMCNC: 33.4 G/DL (ref 32–36.5)
MCV RBC AUTO: 95.3 FL (ref 80–96)
PLATELET # BLD AUTO: 245 10^3/UL (ref 150–450)
RBC # BLD AUTO: 3.83 10^6/UL (ref 4–5.4)
WBC # BLD AUTO: 12.6 10^3/UL (ref 4–10)

## 2021-01-28 PROCEDURE — 82950 GLUCOSE TEST: CPT

## 2021-01-28 PROCEDURE — 86900 BLOOD TYPING SEROLOGIC ABO: CPT

## 2021-01-28 PROCEDURE — 85027 COMPLETE CBC AUTOMATED: CPT

## 2021-01-28 PROCEDURE — 86850 RBC ANTIBODY SCREEN: CPT

## 2021-01-28 PROCEDURE — 36415 COLL VENOUS BLD VENIPUNCTURE: CPT

## 2021-01-28 PROCEDURE — 86901 BLOOD TYPING SEROLOGIC RH(D): CPT

## 2021-03-09 ENCOUNTER — HOSPITAL ENCOUNTER (OUTPATIENT)
Dept: HOSPITAL 53 - M WHC | Age: 31
End: 2021-03-09
Attending: ADVANCED PRACTICE MIDWIFE
Payer: COMMERCIAL

## 2021-03-09 DIAGNOSIS — Z3A.32: ICD-10-CM

## 2021-03-09 DIAGNOSIS — O10.019: Primary | ICD-10-CM

## 2021-03-09 NOTE — REP
INDICATION:

ESSENTIAL HYPERTENSION,GROWTH,ZINA.



COMPARISON:

01/05/2021.



TECHNIQUE:

Real-time sonographic evaluation of the gravid uterus performed.



FINDINGS:

Estimated gestational age is32 weeks 0 days, EDC 05/04/2021.  Today's measurements

indicate appropriate growth.



Presentation: Cephalic

Placenta posterior, grade 2, without evidence of placenta previa.

Fetal heart rate is recorded at 161 beats per minute.

Amniotic fluid is subjectively normal.  ZINA 11.8, normal range 8.6-24.2.

Closed cervical length is measured at 3.1 cm.



Biometry chart:



BPD: 80 mm, 31 weeks 6 days, 49th percentile.

HC: 299 mm, 33 weeks 1 days, 66th percentile

AC: 300 mm, 34 weeks 0 days, 80th percentile

Femur length: 65 mm, 33 weeks 4 days, 74th percentile

HC to AC ratio: 1.00, normal range 0.95-1.14.



Estimated fetal weight: 2225g, 86th percentile.





IMPRESSION:

Viable single intrauterine gestation as above.





<Electronically signed by Pako Brower > 03/09/21 [id number]

## 2021-03-25 ENCOUNTER — HOSPITAL ENCOUNTER (OUTPATIENT)
Dept: HOSPITAL 53 - M LABSMTC | Age: 31
End: 2021-03-25
Attending: PEDIATRICS

## 2021-03-25 DIAGNOSIS — Z11.52: Primary | ICD-10-CM

## 2021-03-29 ENCOUNTER — HOSPITAL ENCOUNTER (OUTPATIENT)
Dept: HOSPITAL 53 - M WHC | Age: 31
End: 2021-03-29
Attending: ADVANCED PRACTICE MIDWIFE
Payer: COMMERCIAL

## 2021-03-29 DIAGNOSIS — O10.019: Primary | ICD-10-CM

## 2021-03-29 DIAGNOSIS — Z3A.34: ICD-10-CM

## 2021-03-29 NOTE — REP
INDICATION:

HYPERTENSION,GROWTH,ZINA.



COMPARISON:

Comparison study March 9, 2021..



TECHNIQUE:

Transabdominal obstetric sonography.



FINDINGS:

Scanning through the gravid uterus demonstrates a viable single intrauterine gestation

in cephalic fetal lie.  Fetal motion is observed and fetal heart rate is recorded at

155 beats per minute.  A posterior placenta is seen, grade 2, without evidence of

placenta previa. Closed cervical length is measured at 3.6 cm transabdominally.  No

extrauterine abnormality is observed.  Amniotic fluid is subjectively normal.  ZINA is

normal 10.9 cm..



.



Biometry chart:

BPD 8.6 cm, 34 weeks 4 days

Head circumference 33.4 cm, 38 weeks 1 day

Abdominal circumference 36.3 cm, 40 weeks 1 day

Femur length 7.0 cm, 35 weeks 6 days

Humeral length 6.1 cm, 35 weeks 4 days

HC AC ratio 0.92 (0.942 1.13)

Cephalic index normal 0.70

Estimated fetal weight 3543 g, 7 lb 12 oz, greater than 97th percentile for 34 weeks 6

days



IMPRESSION:

Viable single intrauterine gestation at 36 weeks 6 days by today's composite

sonographic criteria.  ISAIAH by today's sonography April 20, 2021.  No complication

identified.



Expected gestational age estimate based on known ISAIAH of May 4, 2021 34 weeks 6 days.

Estimated fetal weight in 97 percentile.





<Electronically signed by Jeff Cody > 03/29/21 [id number]

## 2021-04-14 ENCOUNTER — HOSPITAL ENCOUNTER (OUTPATIENT)
Dept: HOSPITAL 53 - M LABSMTC | Age: 31
End: 2021-04-14
Attending: OBSTETRICS & GYNECOLOGY
Payer: COMMERCIAL

## 2021-04-14 DIAGNOSIS — Z20.822: Primary | ICD-10-CM

## 2021-04-14 DIAGNOSIS — Z3A.37: ICD-10-CM

## 2021-04-16 ENCOUNTER — HOSPITAL ENCOUNTER (OUTPATIENT)
Dept: HOSPITAL 53 - M WHC | Age: 31
End: 2021-04-16
Attending: ADVANCED PRACTICE MIDWIFE
Payer: COMMERCIAL

## 2021-04-16 DIAGNOSIS — O10.013: Primary | ICD-10-CM

## 2021-04-16 DIAGNOSIS — Z3A.38: ICD-10-CM

## 2021-04-16 NOTE — REP
INDICATION:

GROWTH/ZINA.



TECHNIQUE:

Transabdominal



FINDINGS:

Multiple ultrasonographic images of the gravid uterus shows a single living

intrauterine gestation in the cephalic presentation.  Doppler interrogation of the

fetal heart shows a heart rate of 155 beats per minute.  The placenta is posterior and

not low-lying.  The subjective amniotic fluid volume is within normal limits.  The

calculated amniotic fluid index is 12.3 with an expected range of 7.4-24.2.



BPD 9.0 cm (36 weeks 3 days)



HC 33.4 cm (38 weeks 1 day)



AC 37.7 cm (41 weeks 5 days)



FL 7.4 cm (37 weeks 5 days)



The estimated fetal weight is 3886 g which is greater than the 97th percentile for 37

week 3 day gestational age.



IMPRESSION:

Single living intrauterine gestation as described above with an estimated gestational

age of 38 weeks 4 days via composite criteria and an estimated date of delivery of

04/26/2021 based today's exam.





<Electronically signed by Saw Stein > 04/16/21 [id number]

## 2021-04-21 ENCOUNTER — HOSPITAL ENCOUNTER (INPATIENT)
Dept: HOSPITAL 53 - M LDI | Age: 31
LOS: 3 days | Discharge: HOME | DRG: 560 | End: 2021-04-24
Attending: OBSTETRICS & GYNECOLOGY | Admitting: ADVANCED PRACTICE MIDWIFE
Payer: COMMERCIAL

## 2021-04-21 VITALS — BODY MASS INDEX: 38.83 KG/M2 | HEIGHT: 61 IN | WEIGHT: 205.69 LBS

## 2021-04-21 VITALS — DIASTOLIC BLOOD PRESSURE: 69 MMHG | SYSTOLIC BLOOD PRESSURE: 120 MMHG

## 2021-04-21 DIAGNOSIS — Z3A.38: ICD-10-CM

## 2021-04-21 PROCEDURE — 3E0P7GC INTRODUCTION OF OTHER THERAPEUTIC SUBSTANCE INTO FEMALE REPRODUCTIVE, VIA NATURAL OR ARTIFICIAL OPENING: ICD-10-PCS | Performed by: ADVANCED PRACTICE MIDWIFE

## 2021-04-22 VITALS — DIASTOLIC BLOOD PRESSURE: 86 MMHG | SYSTOLIC BLOOD PRESSURE: 153 MMHG

## 2021-04-22 VITALS — DIASTOLIC BLOOD PRESSURE: 79 MMHG | SYSTOLIC BLOOD PRESSURE: 124 MMHG

## 2021-04-22 VITALS — DIASTOLIC BLOOD PRESSURE: 60 MMHG | SYSTOLIC BLOOD PRESSURE: 134 MMHG

## 2021-04-22 VITALS — SYSTOLIC BLOOD PRESSURE: 100 MMHG | DIASTOLIC BLOOD PRESSURE: 55 MMHG

## 2021-04-22 VITALS — SYSTOLIC BLOOD PRESSURE: 110 MMHG | DIASTOLIC BLOOD PRESSURE: 84 MMHG

## 2021-04-22 VITALS — DIASTOLIC BLOOD PRESSURE: 98 MMHG | SYSTOLIC BLOOD PRESSURE: 156 MMHG

## 2021-04-22 VITALS — DIASTOLIC BLOOD PRESSURE: 67 MMHG | SYSTOLIC BLOOD PRESSURE: 137 MMHG

## 2021-04-22 VITALS — DIASTOLIC BLOOD PRESSURE: 71 MMHG | SYSTOLIC BLOOD PRESSURE: 118 MMHG

## 2021-04-22 VITALS — SYSTOLIC BLOOD PRESSURE: 128 MMHG | DIASTOLIC BLOOD PRESSURE: 60 MMHG

## 2021-04-22 VITALS — DIASTOLIC BLOOD PRESSURE: 76 MMHG | SYSTOLIC BLOOD PRESSURE: 150 MMHG

## 2021-04-22 VITALS — DIASTOLIC BLOOD PRESSURE: 46 MMHG | SYSTOLIC BLOOD PRESSURE: 110 MMHG

## 2021-04-22 VITALS — DIASTOLIC BLOOD PRESSURE: 76 MMHG | SYSTOLIC BLOOD PRESSURE: 119 MMHG

## 2021-04-22 VITALS — SYSTOLIC BLOOD PRESSURE: 157 MMHG | DIASTOLIC BLOOD PRESSURE: 76 MMHG

## 2021-04-22 VITALS — DIASTOLIC BLOOD PRESSURE: 71 MMHG | SYSTOLIC BLOOD PRESSURE: 138 MMHG

## 2021-04-22 VITALS — DIASTOLIC BLOOD PRESSURE: 72 MMHG | SYSTOLIC BLOOD PRESSURE: 154 MMHG

## 2021-04-22 VITALS — SYSTOLIC BLOOD PRESSURE: 115 MMHG | DIASTOLIC BLOOD PRESSURE: 72 MMHG

## 2021-04-22 VITALS — SYSTOLIC BLOOD PRESSURE: 129 MMHG | DIASTOLIC BLOOD PRESSURE: 76 MMHG

## 2021-04-22 VITALS — SYSTOLIC BLOOD PRESSURE: 116 MMHG | DIASTOLIC BLOOD PRESSURE: 68 MMHG

## 2021-04-22 VITALS — DIASTOLIC BLOOD PRESSURE: 72 MMHG | SYSTOLIC BLOOD PRESSURE: 162 MMHG

## 2021-04-22 VITALS — DIASTOLIC BLOOD PRESSURE: 85 MMHG | SYSTOLIC BLOOD PRESSURE: 172 MMHG

## 2021-04-22 VITALS — SYSTOLIC BLOOD PRESSURE: 128 MMHG | DIASTOLIC BLOOD PRESSURE: 61 MMHG

## 2021-04-22 VITALS — DIASTOLIC BLOOD PRESSURE: 83 MMHG | SYSTOLIC BLOOD PRESSURE: 150 MMHG

## 2021-04-22 VITALS — DIASTOLIC BLOOD PRESSURE: 92 MMHG | SYSTOLIC BLOOD PRESSURE: 141 MMHG

## 2021-04-22 VITALS — SYSTOLIC BLOOD PRESSURE: 164 MMHG | DIASTOLIC BLOOD PRESSURE: 74 MMHG

## 2021-04-22 VITALS — SYSTOLIC BLOOD PRESSURE: 148 MMHG | DIASTOLIC BLOOD PRESSURE: 70 MMHG

## 2021-04-22 VITALS — SYSTOLIC BLOOD PRESSURE: 172 MMHG | DIASTOLIC BLOOD PRESSURE: 105 MMHG

## 2021-04-22 VITALS — DIASTOLIC BLOOD PRESSURE: 61 MMHG | SYSTOLIC BLOOD PRESSURE: 125 MMHG

## 2021-04-22 VITALS — DIASTOLIC BLOOD PRESSURE: 69 MMHG | SYSTOLIC BLOOD PRESSURE: 154 MMHG

## 2021-04-22 VITALS — DIASTOLIC BLOOD PRESSURE: 82 MMHG | SYSTOLIC BLOOD PRESSURE: 153 MMHG

## 2021-04-22 VITALS — SYSTOLIC BLOOD PRESSURE: 111 MMHG | DIASTOLIC BLOOD PRESSURE: 57 MMHG

## 2021-04-22 VITALS — DIASTOLIC BLOOD PRESSURE: 76 MMHG | SYSTOLIC BLOOD PRESSURE: 129 MMHG

## 2021-04-22 VITALS — DIASTOLIC BLOOD PRESSURE: 77 MMHG | SYSTOLIC BLOOD PRESSURE: 162 MMHG

## 2021-04-22 LAB
ALT SERPL W P-5'-P-CCNC: 20 U/L (ref 12–78)
BILIRUB SERPL-MCNC: 0.4 MG/DL (ref 0.2–1)
CREAT SERPL-MCNC: 0.41 MG/DL (ref 0.55–1.3)
GFR SERPL CREATININE-BSD FRML MDRD: > 60 ML/MIN/{1.73_M2} (ref 60–?)
HCT VFR BLD AUTO: 37.4 % (ref 36–47)
HGB BLD-MCNC: 12.8 G/DL (ref 12–15.5)
LDH SERPL L TO P-CCNC: 199 U/L (ref 84–246)
MCH RBC QN AUTO: 30.8 PG (ref 27–33)
MCHC RBC AUTO-ENTMCNC: 34.2 G/DL (ref 32–36.5)
MCV RBC AUTO: 89.9 FL (ref 80–96)
PLATELET # BLD AUTO: 232 10^3/UL (ref 150–450)
RBC # BLD AUTO: 4.16 10^6/UL (ref 4–5.4)
URATE SERPL-MCNC: 3.3 MG/DL (ref 2.6–6)
WBC # BLD AUTO: 13.3 10^3/UL (ref 4–10)

## 2021-04-22 PROCEDURE — 0KQM0ZZ REPAIR PERINEUM MUSCLE, OPEN APPROACH: ICD-10-PCS | Performed by: OBSTETRICS & GYNECOLOGY

## 2021-04-22 RX ADMIN — IBUPROFEN PRN MG: 800 TABLET, FILM COATED ORAL at 14:03

## 2021-04-22 RX ADMIN — Medication PRN MG: at 09:12

## 2021-04-22 RX ADMIN — SODIUM CHLORIDE, PRESERVATIVE FREE SCH ML: 5 INJECTION INTRAVENOUS at 20:37

## 2021-04-22 RX ADMIN — ACETAMINOPHEN PRN MG: 325 TABLET ORAL at 15:50

## 2021-04-22 RX ADMIN — IBUPROFEN PRN MG: 800 TABLET, FILM COATED ORAL at 22:23

## 2021-04-22 RX ADMIN — SODIUM CHLORIDE, POTASSIUM CHLORIDE, SODIUM LACTATE AND CALCIUM CHLORIDE SCH MLS/HR: 600; 310; 30; 20 INJECTION, SOLUTION INTRAVENOUS at 04:16

## 2021-04-22 RX ADMIN — DOCUSATE SODIUM PRN MG: 100 CAPSULE, LIQUID FILLED ORAL at 20:36

## 2021-04-22 RX ADMIN — Medication PRN MG: at 09:05

## 2021-04-22 RX ADMIN — SODIUM CHLORIDE, POTASSIUM CHLORIDE, SODIUM LACTATE AND CALCIUM CHLORIDE SCH MLS/HR: 600; 310; 30; 20 INJECTION, SOLUTION INTRAVENOUS at 08:05

## 2021-04-22 RX ADMIN — ACETAMINOPHEN PRN MG: 325 TABLET ORAL at 20:36

## 2021-04-22 RX ADMIN — Medication PRN MG: at 09:21

## 2021-04-22 NOTE — DNPDOC
[facility] Delivery Note


Delivery Note


DATE OF DELIVERY: 2021





TIME OF BIRTH: [birth year]





GENDER: Female  





APGARS: 8 and 9





WEIGHT: 4210 grams or 9 pounds 5ounces.  





LACERATIONS: 2MLL





ANESTHESIA:  epiduralnone  





ESTIMATED BLOOD LOSS: 300ml





COUNTS: 5 laparotomy sponges accounted for prior to after delivery.   1 sharps 

removed from delivery field. 


   


DELIVERY NOTE: On on 2021 Mrs Barillas, a 30-year-old  3 now para 3 

had a spontaneous vaginal delivery of a liveborn female infant Apgars 8 and 9 

weight was 9 lbs. 5 oz. or 4210 g.  Head was delivered occiput anterior (OA), 

followed by delivery of the shoulders and corpus.  Infant was handed to mom with

a good cry.  Cord was clamped times two and was cut by support person under my 

direction.  Placenta was then drained and delivered grossly intact.  A premixed 

bag of 500 mL of normal saline with 30 units of Pitocin was then bolused along 

with uterine massage until the uterus was firm.  On inspection there was a 2MLL 

that was repaired with 3-0 Vicryl. On reinspection, cervix, vagina, perineum was

grossly intact and hemostatic.  Mom and baby in recovery on stable condition.











AVTAR BECK MD.                 2021 11:18

## 2021-04-22 NOTE — HPE
HISTORY AND PHYSICAL



DATE OF ADMISSION:  2021



HISTORY OF PRESENT ILLNESS:  Perez is a 30-year-old  3 para 2-0-0-2 at

38 and 1/7th weeks gestation, EDC of 2021 based on first trimester

ultrasound. She presents to Labor and Delivery today for induction of labor due

to chronic hypertension. Her hypertension has been well-controlled with

Labetalol 100 mg p.o. b.i.d. She reports some occasional contractions, denies

vaginal bleeding or leakage of fluid. The fetus has been active. Her prenatal

care initiated at [facility] in the first trimester has been

appropriate throughout with reassuring antepartal fetal testing as well as

appropriate growth ultrasounds. 



OBSTETRIC HISTORY: In 2015, 37 weeks, 7 pound, 10 ounce male, vaginal

delivery, induction for preeclampsia. September 15, 2018, 38 weeks, 7 pound, 11

ounce female, vaginal delivery following an induction for gestational

hypertension.



OBSTETRIC LABS: A negative, antibody screen negative, syphilis negative,

gonorrhea and chlamydia negative, hepatitis B surface antigen negative,

hepatitis C antibody non-reactive, HIV negative, rubella immune, gestational

diabetic screening is 90. Urine culture: No growth. GBS negative. She is to

undergo noninvasive prenatal testing with [facility] which demonstrated low risk

aneuploidy female fetus. The patient was not aware of the fetal sex.



PAST MEDICAL HISTORY:  Preeclampsia, chronic hypertension, gestational

hypertension, childhood asthma, childhood varicella.



PAST SURGICAL HISTORY:  Knee surgery x2, elbow surgery, tonsillectomy.



FAMILY HISTORY:  Hypertension, high cholesterol, hyperlipidemia and thyroid

disease. 



SOCIAL HISTORY: The patient is . She is a nonsmoker. She denies alcohol

and drug use. No history of sexually transmitted infections. She denies a

history of abuse, physical, sexual and emotional. 



CURRENT MEDICATIONS: 

  1.  Labetalol 100 mg p.o. b.i.d.

  2.  Prenatal vitamins.

  3.  Aspirin 81 mg daily. 



ALLERGIES: Codeine. 



OBJECTIVE: BP is 132/89. She is alert and oriented x3. Fetal heart rate is 150

with moderate variability, positive accelerations, negative decelerations, no

__ or regular contractions. Her abdomen is gravid, cephalic presentation.

Estimated fetal weight 3800 grams. Sterile vaginal exam: 2 cm dilated, 50%

effaced, -3 station, posterior and soft. No show with the exam.



ASSESSMENT: Intrauterine pregnancy at 38 and 1/7th weeks. Fetal heart rate

Category 1, chronic hypertension.



PLAN; Admit the patient to labor and delivery, out of bed ad bonnie, regular diet

at this time, saline lock, routine laboratories with the addition of

preeclamptic profile. Plan to start Misoprostol 50 mcg p.o. for cervical

ripening, may consider assisted rupture of membranes for labor augmentation,

likely IV Pitocin for labor induction. The risks, benefits and alternatives

have been reviewed with the patient and her . They have had all of their

questions answered. She has been verbally consented for emergency surgery and

blood products if they are necessary. I do anticipate cervical ripening, labor

and a spontaneous vaginal delivery.

## 2021-04-23 VITALS — SYSTOLIC BLOOD PRESSURE: 132 MMHG | DIASTOLIC BLOOD PRESSURE: 86 MMHG

## 2021-04-23 VITALS — DIASTOLIC BLOOD PRESSURE: 61 MMHG | SYSTOLIC BLOOD PRESSURE: 116 MMHG

## 2021-04-23 RX ADMIN — ACETAMINOPHEN PRN MG: 500 TABLET ORAL at 02:25

## 2021-04-23 RX ADMIN — DOCUSATE SODIUM PRN MG: 100 CAPSULE, LIQUID FILLED ORAL at 19:22

## 2021-04-23 RX ADMIN — IBUPROFEN PRN MG: 800 TABLET, FILM COATED ORAL at 15:15

## 2021-04-23 RX ADMIN — IBUPROFEN PRN MG: 800 TABLET, FILM COATED ORAL at 05:52

## 2021-04-23 RX ADMIN — ACETAMINOPHEN PRN MG: 500 TABLET ORAL at 12:14

## 2021-04-23 RX ADMIN — Medication SCH TAB: at 07:31

## 2021-04-23 RX ADMIN — ACETAMINOPHEN PRN MG: 500 TABLET ORAL at 19:22

## 2021-04-23 RX ADMIN — SODIUM CHLORIDE, PRESERVATIVE FREE SCH ML: 5 INJECTION INTRAVENOUS at 22:00

## 2021-04-23 RX ADMIN — SODIUM CHLORIDE, PRESERVATIVE FREE SCH ML: 5 INJECTION INTRAVENOUS at 14:00

## 2021-04-23 RX ADMIN — SODIUM CHLORIDE, PRESERVATIVE FREE SCH ML: 5 INJECTION INTRAVENOUS at 05:31

## 2021-04-23 NOTE — IPNPDOC
Postpartum Progress Note


Date of Service:  2021


Postpartum Day#:  1


Postpartum Progress Note


SUBJECT: Doing well without complaints. Ambulating, voiding and pain is well-c

ontrolled. Reports minimal lochia. 





OBJECTIVE: 


VITAL SIGNS: Within normal limits, afebrile.


Alert and oriented times three.


Abdomen: Fundus firm at U-2. Soft, NTTP.


Ext: neg calf tenderness.


ASSESSMENT: Postpartum day #1 status post . Recovering in stable condition.





PLAN:


1. Continue routine postpartum care


2. Discharge plans for tomorrow





VS, I&O, 24H, Fishbone


Vital Signs/I&O





Vital Signs








  Date Time  Temp Pulse Resp B/P (MAP) Pulse Ox O2 Delivery O2 Flow Rate FiO2


 


21 18:00 99.2 84 18 100/55 (70) 98 Room Air  














I&O- Last 24 Hours up to 6 AM 


 


 21





 06:00


 


Intake Total 4100 ml


 


Output Total 1200 ml


 


Balance 2900 ml

















AVTAR BECK MD.                 2021 05:32

## 2021-04-24 VITALS — DIASTOLIC BLOOD PRESSURE: 70 MMHG | SYSTOLIC BLOOD PRESSURE: 111 MMHG

## 2021-04-24 RX ADMIN — IBUPROFEN PRN MG: 800 TABLET, FILM COATED ORAL at 01:05

## 2021-04-24 RX ADMIN — IBUPROFEN PRN MG: 800 TABLET, FILM COATED ORAL at 10:32

## 2021-04-24 RX ADMIN — ACETAMINOPHEN PRN MG: 500 TABLET ORAL at 08:07

## 2021-04-24 RX ADMIN — Medication SCH TAB: at 08:07

## 2021-04-24 RX ADMIN — SODIUM CHLORIDE, PRESERVATIVE FREE SCH ML: 5 INJECTION INTRAVENOUS at 06:00

## 2021-09-27 ENCOUNTER — HOSPITAL ENCOUNTER (OUTPATIENT)
Dept: HOSPITAL 53 - M LAB | Age: 31
End: 2021-09-27
Attending: PHYSICIAN ASSISTANT
Payer: COMMERCIAL

## 2021-09-27 DIAGNOSIS — Z13.29: Primary | ICD-10-CM

## 2021-09-27 LAB
25(OH)D3 SERPL-MCNC: 36.4 NG/ML (ref 30–100)
ALBUMIN SERPL BCG-MCNC: 4.2 GM/DL (ref 3.2–5.2)
ALT SERPL W P-5'-P-CCNC: 32 U/L (ref 12–78)
BASOPHILS # BLD AUTO: 0.1 10^3/UL (ref 0–0.2)
BASOPHILS NFR BLD AUTO: 0.9 % (ref 0–1)
BILIRUB SERPL-MCNC: 0.7 MG/DL (ref 0.2–1)
BUN SERPL-MCNC: 11 MG/DL (ref 7–18)
CALCIUM SERPL-MCNC: 8.6 MG/DL (ref 8.5–10.1)
CHLORIDE SERPL-SCNC: 107 MEQ/L (ref 98–107)
CHOLEST SERPL-MCNC: 187 MG/DL (ref ?–200)
CHOLEST/HDLC SERPL: 3.6 {RATIO} (ref ?–5)
CO2 SERPL-SCNC: 29 MEQ/L (ref 21–32)
CREAT SERPL-MCNC: 0.89 MG/DL (ref 0.55–1.3)
EOSINOPHIL # BLD AUTO: 0.3 10^3/UL (ref 0–0.5)
EOSINOPHIL NFR BLD AUTO: 4.4 % (ref 0–3)
EST. AVERAGE GLUCOSE BLD GHB EST-MCNC: 91 MG/DL (ref 60–110)
GFR SERPL CREATININE-BSD FRML MDRD: > 60 ML/MIN/{1.73_M2} (ref 60–?)
GLUCOSE SERPL-MCNC: 82 MG/DL (ref 70–100)
HCT VFR BLD AUTO: 42.2 % (ref 36–47)
HDLC SERPL-MCNC: 52 MG/DL (ref 40–?)
HGB BLD-MCNC: 14.6 G/DL (ref 12–15.5)
LDLC SERPL CALC-MCNC: 120 MG/DL (ref ?–100)
LYMPHOCYTES # BLD AUTO: 1.4 10^3/UL (ref 1.5–5)
LYMPHOCYTES NFR BLD AUTO: 24 % (ref 24–44)
MCH RBC QN AUTO: 30.9 PG (ref 27–33)
MCHC RBC AUTO-ENTMCNC: 34.6 G/DL (ref 32–36.5)
MCV RBC AUTO: 89.2 FL (ref 80–96)
MONOCYTES # BLD AUTO: 0.5 10^3/UL (ref 0–0.8)
MONOCYTES NFR BLD AUTO: 8.6 % (ref 2–8)
NEUTROPHILS # BLD AUTO: 3.5 10^3/UL (ref 1.5–8.5)
NEUTROPHILS NFR BLD AUTO: 61.7 % (ref 36–66)
NONHDLC SERPL-MCNC: 135 MG/DL
PLATELET # BLD AUTO: 288 10^3/UL (ref 150–450)
POTASSIUM SERPL-SCNC: 4.2 MEQ/L (ref 3.5–5.1)
PROT SERPL-MCNC: 7.4 GM/DL (ref 6.4–8.2)
RBC # BLD AUTO: 4.73 10^6/UL (ref 4–5.4)
SODIUM SERPL-SCNC: 140 MEQ/L (ref 136–145)
T4 FREE SERPL-MCNC: 1.12 NG/DL (ref 0.76–1.46)
TRIGL SERPL-MCNC: 76 MG/DL (ref ?–150)
TSH SERPL DL<=0.005 MIU/L-ACNC: 0.24 UIU/ML (ref 0.36–3.74)
WBC # BLD AUTO: 5.7 10^3/UL (ref 4–10)

## 2021-11-03 ENCOUNTER — HOSPITAL ENCOUNTER (OUTPATIENT)
Dept: HOSPITAL 53 - M EMP | Age: 31
End: 2021-11-03
Attending: FAMILY MEDICINE

## 2021-11-03 DIAGNOSIS — Z20.822: Primary | ICD-10-CM

## 2021-11-29 ENCOUNTER — HOSPITAL ENCOUNTER (OUTPATIENT)
Dept: HOSPITAL 53 - M EMP | Age: 31
Discharge: HOME | End: 2021-11-29
Attending: PEDIATRICS

## 2021-11-29 DIAGNOSIS — Z11.52: Primary | ICD-10-CM

## 2021-12-07 ENCOUNTER — HOSPITAL ENCOUNTER (OUTPATIENT)
Dept: HOSPITAL 53 - M EMP | Age: 31
End: 2021-12-07
Attending: FAMILY MEDICINE

## 2021-12-07 DIAGNOSIS — Z20.822: Primary | ICD-10-CM

## 2022-03-30 ENCOUNTER — HOSPITAL ENCOUNTER (OUTPATIENT)
Dept: HOSPITAL 53 - M WHC | Age: 32
End: 2022-03-30
Attending: ADVANCED PRACTICE MIDWIFE
Payer: COMMERCIAL

## 2022-03-30 DIAGNOSIS — N63.12: Primary | ICD-10-CM

## 2022-03-30 PROCEDURE — 77065 DX MAMMO INCL CAD UNI: CPT

## 2022-03-30 PROCEDURE — 76642 ULTRASOUND BREAST LIMITED: CPT

## 2022-04-18 ENCOUNTER — HOSPITAL ENCOUNTER (OUTPATIENT)
Dept: HOSPITAL 53 - M EMP | Age: 32
End: 2022-04-18
Attending: FAMILY MEDICINE

## 2022-04-18 DIAGNOSIS — Z11.52: Primary | ICD-10-CM

## 2022-06-10 ENCOUNTER — HOSPITAL ENCOUNTER (OUTPATIENT)
Dept: HOSPITAL 53 - M PLALAB | Age: 32
End: 2022-06-10
Attending: SURGERY
Payer: COMMERCIAL

## 2022-06-10 DIAGNOSIS — Z13.79: Primary | ICD-10-CM

## 2022-06-15 ENCOUNTER — HOSPITAL ENCOUNTER (OUTPATIENT)
Dept: HOSPITAL 53 - M WHC | Age: 32
End: 2022-06-15
Attending: SURGERY
Payer: COMMERCIAL

## 2022-06-15 ENCOUNTER — HOSPITAL ENCOUNTER (OUTPATIENT)
Dept: HOSPITAL 53 - M WHCPRO | Age: 32
End: 2022-06-15
Attending: SURGERY
Payer: COMMERCIAL

## 2022-06-15 VITALS — DIASTOLIC BLOOD PRESSURE: 76 MMHG | SYSTOLIC BLOOD PRESSURE: 120 MMHG

## 2022-06-15 DIAGNOSIS — Z12.31: Primary | ICD-10-CM

## 2022-06-15 DIAGNOSIS — N63.12: ICD-10-CM

## 2022-06-15 DIAGNOSIS — Z80.3: ICD-10-CM

## 2022-06-15 DIAGNOSIS — R92.8: Primary | ICD-10-CM

## 2022-06-15 DIAGNOSIS — Z80.0: ICD-10-CM

## 2022-06-15 PROCEDURE — 19083 BX BREAST 1ST LESION US IMAG: CPT

## 2022-06-15 PROCEDURE — 77065 DX MAMMO INCL CAD UNI: CPT

## 2022-06-15 PROCEDURE — 88305 TISSUE EXAM BY PATHOLOGIST: CPT

## 2022-09-14 ENCOUNTER — HOSPITAL ENCOUNTER (OUTPATIENT)
Dept: HOSPITAL 53 - M LAB REF | Age: 32
End: 2022-09-14
Attending: PHYSICIAN ASSISTANT
Payer: COMMERCIAL

## 2022-09-14 DIAGNOSIS — Z13.29: Primary | ICD-10-CM

## 2022-09-14 LAB
25(OH)D3 SERPL-MCNC: 48.6 NG/ML (ref 30–100)
ALBUMIN SERPL BCG-MCNC: 4.2 GM/DL (ref 3.2–5.2)
ALT SERPL W P-5'-P-CCNC: 23 U/L (ref 12–78)
BASOPHILS # BLD AUTO: 0.1 10^3/UL (ref 0–0.2)
BASOPHILS NFR BLD AUTO: 1.1 % (ref 0–1)
BILIRUB SERPL-MCNC: 0.5 MG/DL (ref 0.2–1)
BUN SERPL-MCNC: 15 MG/DL (ref 7–18)
CALCIUM SERPL-MCNC: 9.7 MG/DL (ref 8.5–10.1)
CHLORIDE SERPL-SCNC: 104 MEQ/L (ref 98–107)
CO2 SERPL-SCNC: 29 MEQ/L (ref 21–32)
CREAT SERPL-MCNC: 0.71 MG/DL (ref 0.55–1.3)
EOSINOPHIL # BLD AUTO: 0.2 10^3/UL (ref 0–0.5)
EOSINOPHIL NFR BLD AUTO: 4 % (ref 0–3)
EST. AVERAGE GLUCOSE BLD GHB EST-MCNC: 91 MG/DL (ref 60–110)
GFR SERPL CREATININE-BSD FRML MDRD: > 60 ML/MIN/{1.73_M2} (ref 60–?)
GLUCOSE SERPL-MCNC: 83 MG/DL (ref 70–100)
HCT VFR BLD AUTO: 42.9 % (ref 36–47)
HGB BLD-MCNC: 14.8 G/DL (ref 12–15.5)
LYMPHOCYTES # BLD AUTO: 1.5 10^3/UL (ref 1.5–5)
LYMPHOCYTES NFR BLD AUTO: 26.9 % (ref 24–44)
MCH RBC QN AUTO: 31.8 PG (ref 27–33)
MCHC RBC AUTO-ENTMCNC: 34.5 G/DL (ref 32–36.5)
MCV RBC AUTO: 92.1 FL (ref 80–96)
MONOCYTES # BLD AUTO: 0.5 10^3/UL (ref 0–0.8)
MONOCYTES NFR BLD AUTO: 7.9 % (ref 2–8)
NEUTROPHILS # BLD AUTO: 3.4 10^3/UL (ref 1.5–8.5)
NEUTROPHILS NFR BLD AUTO: 59.9 % (ref 36–66)
PLATELET # BLD AUTO: 316 10^3/UL (ref 150–450)
POTASSIUM SERPL-SCNC: 4.6 MEQ/L (ref 3.5–5.1)
PROT SERPL-MCNC: 7.6 GM/DL (ref 6.4–8.2)
RBC # BLD AUTO: 4.66 10^6/UL (ref 4–5.4)
SODIUM SERPL-SCNC: 136 MEQ/L (ref 136–145)
T4 FREE SERPL-MCNC: 1.12 NG/DL (ref 0.76–1.46)
TSH SERPL DL<=0.005 MIU/L-ACNC: 0.36 UIU/ML (ref 0.36–3.74)
VIT B12 SERPL-MCNC: 646 PG/ML (ref 247–911)
WBC # BLD AUTO: 5.7 10^3/UL (ref 4–10)

## 2022-09-15 LAB — FOLATE SERPL-MCNC: 15.5 NG/ML (ref 3–?)

## 2022-09-16 ENCOUNTER — HOSPITAL ENCOUNTER (OUTPATIENT)
Dept: HOSPITAL 53 - M SFHCWAGY | Age: 32
End: 2022-09-16
Attending: ADVANCED PRACTICE MIDWIFE
Payer: COMMERCIAL

## 2022-09-16 DIAGNOSIS — Z12.4: Primary | ICD-10-CM

## 2022-09-16 PROCEDURE — 87624 HPV HI-RISK TYP POOLED RSLT: CPT

## 2023-03-09 ENCOUNTER — HOSPITAL ENCOUNTER (OUTPATIENT)
Dept: HOSPITAL 53 - M PLAIMG | Age: 33
End: 2023-03-09
Attending: SURGERY
Payer: COMMERCIAL

## 2023-03-09 ENCOUNTER — HOSPITAL ENCOUNTER (OUTPATIENT)
Dept: HOSPITAL 53 - M WHC | Age: 33
End: 2023-03-09
Attending: NURSE PRACTITIONER
Payer: COMMERCIAL

## 2023-03-09 DIAGNOSIS — D24.1: Primary | ICD-10-CM

## 2023-03-09 DIAGNOSIS — Z91.89: Primary | ICD-10-CM

## 2024-11-11 ENCOUNTER — HOSPITAL ENCOUNTER (OUTPATIENT)
Dept: HOSPITAL 53 - M SOG | Age: 34
End: 2024-11-11
Attending: PHYSICIAN ASSISTANT
Payer: COMMERCIAL

## 2024-11-11 DIAGNOSIS — M25.531: Primary | ICD-10-CM

## 2024-11-25 ENCOUNTER — HOSPITAL ENCOUNTER (OUTPATIENT)
Dept: HOSPITAL 53 - M SOG | Age: 34
End: 2024-11-25
Attending: PHYSICIAN ASSISTANT
Payer: COMMERCIAL

## 2024-11-25 DIAGNOSIS — M25.531: Primary | ICD-10-CM

## 2025-01-29 ENCOUNTER — HOSPITAL ENCOUNTER (EMERGENCY)
Dept: HOSPITAL 53 - M ED | Age: 35
Discharge: HOME | End: 2025-01-29
Payer: COMMERCIAL

## 2025-01-29 VITALS — BODY MASS INDEX: 28.39 KG/M2 | HEIGHT: 61 IN | WEIGHT: 150.36 LBS

## 2025-01-29 VITALS — DIASTOLIC BLOOD PRESSURE: 92 MMHG | SYSTOLIC BLOOD PRESSURE: 135 MMHG | TEMPERATURE: 98.5 F | OXYGEN SATURATION: 100 %

## 2025-01-29 DIAGNOSIS — A08.4: Primary | ICD-10-CM

## 2025-01-29 DIAGNOSIS — R19.7: ICD-10-CM

## 2025-01-29 DIAGNOSIS — Z79.899: ICD-10-CM

## 2025-01-29 DIAGNOSIS — Z88.5: ICD-10-CM

## 2025-01-29 DIAGNOSIS — Z79.1: ICD-10-CM

## 2025-01-29 DIAGNOSIS — R11.2: ICD-10-CM

## 2025-01-29 LAB
ALBUMIN SERPL BCG-MCNC: 4.1 G/DL (ref 3.2–5.2)
ALP SERPL-CCNC: 52 U/L (ref 35–104)
ALT SERPL W P-5'-P-CCNC: 20 U/L (ref 7–40)
AST SERPL-CCNC: 13 U/L (ref ?–34)
BASOPHILS # BLD AUTO: 0 10^3/UL (ref 0–0.2)
BASOPHILS NFR BLD AUTO: 0.7 % (ref 0–1)
BILIRUB CONJ SERPL-MCNC: 0.2 MG/DL (ref ?–0.4)
BILIRUB SERPL-MCNC: 0.7 MG/DL (ref 0.3–1.2)
BUN SERPL-MCNC: 10 MG/DL (ref 9–23)
CALCIUM SERPL-MCNC: 8.8 MG/DL (ref 8.5–10.1)
CHLORIDE SERPL-SCNC: 103 MMOL/L (ref 98–107)
CO2 SERPL-SCNC: 29 MMOL/L (ref 20–31)
CREAT SERPL-MCNC: 0.8 MG/DL (ref 0.55–1.3)
EOSINOPHIL # BLD AUTO: 0.2 10^3/UL (ref 0–0.5)
EOSINOPHIL NFR BLD AUTO: 2.6 % (ref 0–3)
GFR SERPL CREATININE-BSD FRML MDRD: > 60 ML/MIN/{1.73_M2} (ref 60–?)
GLUCOSE SERPL-MCNC: 86 MG/DL (ref 60–100)
HCT VFR BLD AUTO: 39.6 % (ref 36–47)
HGB BLD-MCNC: 14.2 G/DL (ref 12–15.5)
LIPASE SERPL-CCNC: 32 U/L (ref 12–53)
LYMPHOCYTES # BLD AUTO: 1.9 10^3/UL (ref 1.5–5)
LYMPHOCYTES NFR BLD AUTO: 31 % (ref 24–44)
MCH RBC QN AUTO: 32 PG (ref 27–33)
MCHC RBC AUTO-ENTMCNC: 35.9 G/DL (ref 32–36.5)
MCV RBC AUTO: 89.2 FL (ref 80–96)
MONOCYTES # BLD AUTO: 0.4 10^3/UL (ref 0–0.8)
MONOCYTES NFR BLD AUTO: 7.1 % (ref 2–8)
NEUTROPHILS # BLD AUTO: 3.6 10^3/UL (ref 1.5–8.5)
NEUTROPHILS NFR BLD AUTO: 58.4 % (ref 36–66)
PLATELET # BLD AUTO: 323 10^3/UL (ref 150–450)
POTASSIUM SERPL-SCNC: 3.8 MMOL/L (ref 3.5–5.1)
PROT SERPL-MCNC: 7.2 G/DL (ref 5.7–8.2)
RBC # BLD AUTO: 4.44 10^6/UL (ref 4–5.4)
SODIUM SERPL-SCNC: 143 MMOL/L (ref 136–145)
WBC # BLD AUTO: 6.1 10^3/UL (ref 4–10)

## 2025-01-29 RX ADMIN — SODIUM CHLORIDE, POTASSIUM CHLORIDE, SODIUM LACTATE AND CALCIUM CHLORIDE ONE MLS/HR: 600; 310; 30; 20 INJECTION, SOLUTION INTRAVENOUS at 16:27

## 2025-01-30 ENCOUNTER — HOSPITAL ENCOUNTER (OUTPATIENT)
Dept: HOSPITAL 53 - M LAB REF | Age: 35
End: 2025-01-30
Payer: COMMERCIAL

## 2025-01-30 DIAGNOSIS — R19.7: Primary | ICD-10-CM

## 2025-01-30 DIAGNOSIS — A08.4: ICD-10-CM

## 2025-02-03 ENCOUNTER — HOSPITAL ENCOUNTER (EMERGENCY)
Dept: HOSPITAL 53 - M ED | Age: 35
Discharge: HOME | End: 2025-02-03
Payer: COMMERCIAL

## 2025-02-03 VITALS — OXYGEN SATURATION: 99 % | DIASTOLIC BLOOD PRESSURE: 55 MMHG | SYSTOLIC BLOOD PRESSURE: 104 MMHG | TEMPERATURE: 98.1 F

## 2025-02-03 VITALS — BODY MASS INDEX: 28.05 KG/M2 | HEIGHT: 61 IN | WEIGHT: 148.59 LBS

## 2025-02-03 DIAGNOSIS — A09: Primary | ICD-10-CM

## 2025-02-03 DIAGNOSIS — E86.0: ICD-10-CM

## 2025-02-03 DIAGNOSIS — Z79.899: ICD-10-CM

## 2025-02-03 DIAGNOSIS — F10.10: ICD-10-CM

## 2025-02-03 DIAGNOSIS — Z88.5: ICD-10-CM

## 2025-02-03 LAB
ALBUMIN SERPL BCG-MCNC: 4.3 G/DL (ref 3.2–5.2)
ALP SERPL-CCNC: 50 U/L (ref 35–104)
ALT SERPL W P-5'-P-CCNC: 18 U/L (ref 7–40)
AST SERPL-CCNC: 12 U/L (ref ?–34)
B-HCG SERPL QL: NEGATIVE
BASOPHILS # BLD AUTO: 0 10^3/UL (ref 0–0.2)
BASOPHILS NFR BLD AUTO: 0.3 % (ref 0–1)
BILIRUB SERPL-MCNC: 0.7 MG/DL (ref 0.3–1.2)
BUN SERPL-MCNC: 10 MG/DL (ref 9–23)
CALCIUM SERPL-MCNC: 9.4 MG/DL (ref 8.5–10.1)
CHLORIDE SERPL-SCNC: 108 MMOL/L (ref 98–107)
CO2 SERPL-SCNC: 29 MMOL/L (ref 20–31)
CREAT SERPL-MCNC: 0.72 MG/DL (ref 0.55–1.3)
EOSINOPHIL # BLD AUTO: 0.2 10^3/UL (ref 0–0.5)
EOSINOPHIL NFR BLD AUTO: 2.3 % (ref 0–3)
GFR SERPL CREATININE-BSD FRML MDRD: > 60 ML/MIN/{1.73_M2} (ref 60–?)
GLUCOSE SERPL-MCNC: 88 MG/DL (ref 60–100)
HCT VFR BLD AUTO: 43.1 % (ref 36–47)
HGB BLD-MCNC: 15 G/DL (ref 12–15.5)
LYMPHOCYTES # BLD AUTO: 1.8 10^3/UL (ref 1.5–5)
LYMPHOCYTES NFR BLD AUTO: 18.2 % (ref 24–44)
MCH RBC QN AUTO: 31.3 PG (ref 27–33)
MCHC RBC AUTO-ENTMCNC: 34.8 G/DL (ref 32–36.5)
MCV RBC AUTO: 90 FL (ref 80–96)
MONOCYTES # BLD AUTO: 0.6 10^3/UL (ref 0–0.8)
MONOCYTES NFR BLD AUTO: 5.5 % (ref 2–8)
NEUTROPHILS # BLD AUTO: 7.3 10^3/UL (ref 1.5–8.5)
NEUTROPHILS NFR BLD AUTO: 73.4 % (ref 36–66)
PLATELET # BLD AUTO: 324 10^3/UL (ref 150–450)
POTASSIUM SERPL-SCNC: 4.7 MMOL/L (ref 3.5–5.1)
PROT SERPL-MCNC: 7.4 G/DL (ref 5.7–8.2)
RBC # BLD AUTO: 4.79 10^6/UL (ref 4–5.4)
SODIUM SERPL-SCNC: 143 MMOL/L (ref 136–145)
WBC # BLD AUTO: 10 10^3/UL (ref 4–10)

## 2025-02-03 PROCEDURE — 84703 CHORIONIC GONADOTROPIN ASSAY: CPT

## 2025-02-03 PROCEDURE — 85025 COMPLETE CBC W/AUTO DIFF WBC: CPT

## 2025-02-03 PROCEDURE — 99284 EMERGENCY DEPT VISIT MOD MDM: CPT

## 2025-02-03 PROCEDURE — 80053 COMPREHEN METABOLIC PANEL: CPT

## 2025-02-03 PROCEDURE — 74177 CT ABD & PELVIS W/CONTRAST: CPT

## 2025-02-03 PROCEDURE — 96360 HYDRATION IV INFUSION INIT: CPT
